# Patient Record
Sex: FEMALE
[De-identification: names, ages, dates, MRNs, and addresses within clinical notes are randomized per-mention and may not be internally consistent; named-entity substitution may affect disease eponyms.]

---

## 2024-04-18 ENCOUNTER — ASOB RESULT (OUTPATIENT)
Age: 29
End: 2024-04-18

## 2024-04-18 ENCOUNTER — APPOINTMENT (OUTPATIENT)
Dept: ANTEPARTUM | Facility: CLINIC | Age: 29
End: 2024-04-18
Payer: MEDICAID

## 2024-04-18 PROCEDURE — 76816 OB US FOLLOW-UP PER FETUS: CPT

## 2024-05-06 PROBLEM — Z00.00 ENCOUNTER FOR PREVENTIVE HEALTH EXAMINATION: Status: ACTIVE | Noted: 2024-05-06

## 2024-05-23 ENCOUNTER — APPOINTMENT (OUTPATIENT)
Dept: ANTEPARTUM | Facility: CLINIC | Age: 29
End: 2024-05-23

## 2024-06-06 ENCOUNTER — ASOB RESULT (OUTPATIENT)
Age: 29
End: 2024-06-06

## 2024-06-06 ENCOUNTER — APPOINTMENT (OUTPATIENT)
Dept: ANTEPARTUM | Facility: CLINIC | Age: 29
End: 2024-06-06

## 2024-06-06 PROCEDURE — 76805 OB US >/= 14 WKS SNGL FETUS: CPT

## 2024-06-06 PROCEDURE — 76817 TRANSVAGINAL US OBSTETRIC: CPT

## 2024-07-25 ENCOUNTER — APPOINTMENT (OUTPATIENT)
Dept: ANTEPARTUM | Facility: CLINIC | Age: 29
End: 2024-07-25
Payer: MEDICAID

## 2024-07-25 ENCOUNTER — ASOB RESULT (OUTPATIENT)
Age: 29
End: 2024-07-25

## 2024-07-25 PROCEDURE — 76817 TRANSVAGINAL US OBSTETRIC: CPT

## 2024-07-25 PROCEDURE — 76816 OB US FOLLOW-UP PER FETUS: CPT

## 2024-09-12 ENCOUNTER — ASOB RESULT (OUTPATIENT)
Age: 29
End: 2024-09-12

## 2024-09-12 ENCOUNTER — APPOINTMENT (OUTPATIENT)
Dept: ANTEPARTUM | Facility: CLINIC | Age: 29
End: 2024-09-12
Payer: MEDICAID

## 2024-09-12 PROCEDURE — 76819 FETAL BIOPHYS PROFIL W/O NST: CPT

## 2024-09-12 PROCEDURE — 76816 OB US FOLLOW-UP PER FETUS: CPT

## 2024-09-19 PROBLEM — Z82.49 FAMILY HISTORY OF HYPERTENSION: Status: ACTIVE | Noted: 2024-09-19

## 2024-09-19 PROBLEM — Z3A.37 37 WEEKS GESTATION OF PREGNANCY: Status: ACTIVE | Noted: 2024-09-19

## 2024-09-19 PROBLEM — O99.340 DEPRESSION AFFECTING PREGNANCY: Status: ACTIVE | Noted: 2024-09-19

## 2024-09-19 PROBLEM — O99.210 OBESITY AFFECTING PREGNANCY: Status: ACTIVE | Noted: 2024-09-19

## 2024-09-19 RX ORDER — PRENATAL VIT 49/IRON FUM/FOLIC 6.75-0.2MG
TABLET ORAL
Refills: 0 | Status: ACTIVE | COMMUNITY

## 2024-09-19 RX ORDER — SERTRALINE HYDROCHLORIDE 25 MG/1
TABLET, FILM COATED ORAL
Refills: 0 | Status: ACTIVE | COMMUNITY

## 2024-09-20 ENCOUNTER — APPOINTMENT (OUTPATIENT)
Dept: ANTEPARTUM | Facility: CLINIC | Age: 29
End: 2024-09-20

## 2024-09-20 ENCOUNTER — APPOINTMENT (OUTPATIENT)
Dept: MATERNAL FETAL MEDICINE | Facility: CLINIC | Age: 29
End: 2024-09-20

## 2024-09-20 DIAGNOSIS — O99.210 OBESITY COMPLICATING PREGNANCY, UNSPECIFIED TRIMESTER: ICD-10-CM

## 2024-09-20 DIAGNOSIS — F32.A OTHER MENTAL DISORDERS COMPLICATING PREGNANCY, UNSPECIFIED TRIMESTER: ICD-10-CM

## 2024-09-20 DIAGNOSIS — O99.340 OTHER MENTAL DISORDERS COMPLICATING PREGNANCY, UNSPECIFIED TRIMESTER: ICD-10-CM

## 2024-09-20 DIAGNOSIS — Z82.49 FAMILY HISTORY OF ISCHEMIC HEART DISEASE AND OTHER DISEASES OF THE CIRCULATORY SYSTEM: ICD-10-CM

## 2024-09-20 DIAGNOSIS — Z3A.37 37 WEEKS GESTATION OF PREGNANCY: ICD-10-CM

## 2024-10-03 ENCOUNTER — APPOINTMENT (OUTPATIENT)
Dept: ANTEPARTUM | Facility: CLINIC | Age: 29
End: 2024-10-03

## 2024-10-03 ENCOUNTER — APPOINTMENT (OUTPATIENT)
Dept: MATERNAL FETAL MEDICINE | Facility: CLINIC | Age: 29
End: 2024-10-03

## 2024-10-06 ENCOUNTER — INPATIENT (INPATIENT)
Facility: HOSPITAL | Age: 29
LOS: 1 days | Discharge: HOME | DRG: 833 | End: 2024-10-08
Attending: STUDENT IN AN ORGANIZED HEALTH CARE EDUCATION/TRAINING PROGRAM | Admitting: STUDENT IN AN ORGANIZED HEALTH CARE EDUCATION/TRAINING PROGRAM
Payer: MEDICAID

## 2024-10-06 VITALS — HEART RATE: 67 BPM | SYSTOLIC BLOOD PRESSURE: 119 MMHG | DIASTOLIC BLOOD PRESSURE: 74 MMHG | TEMPERATURE: 99 F

## 2024-10-06 DIAGNOSIS — O26.899 OTHER SPECIFIED PREGNANCY RELATED CONDITIONS, UNSPECIFIED TRIMESTER: ICD-10-CM

## 2024-10-06 DIAGNOSIS — O26.893 OTHER SPECIFIED PREGNANCY RELATED CONDITIONS, THIRD TRIMESTER: ICD-10-CM

## 2024-10-06 DIAGNOSIS — Z98.890 OTHER SPECIFIED POSTPROCEDURAL STATES: Chronic | ICD-10-CM

## 2024-10-06 LAB
ABO RH CONFIRMATION: SIGNIFICANT CHANGE UP
BASOPHILS # BLD AUTO: 0.02 K/UL — SIGNIFICANT CHANGE UP (ref 0–0.2)
BASOPHILS NFR BLD AUTO: 0.2 % — SIGNIFICANT CHANGE UP (ref 0–2)
BLD GP AB SCN SERPL QL: SIGNIFICANT CHANGE UP
EOSINOPHIL # BLD AUTO: 0.01 K/UL — SIGNIFICANT CHANGE UP (ref 0–0.5)
EOSINOPHIL NFR BLD AUTO: 0.1 % — SIGNIFICANT CHANGE UP (ref 0–6)
HCT VFR BLD CALC: 37.4 % — SIGNIFICANT CHANGE UP (ref 34.5–45)
HGB BLD-MCNC: 11.8 G/DL — SIGNIFICANT CHANGE UP (ref 11.5–15.5)
IMM GRANULOCYTES NFR BLD AUTO: 0.3 % — SIGNIFICANT CHANGE UP (ref 0–0.9)
LYMPHOCYTES # BLD AUTO: 1.69 K/UL — SIGNIFICANT CHANGE UP (ref 1–3.3)
LYMPHOCYTES # BLD AUTO: 19 % — SIGNIFICANT CHANGE UP (ref 13–44)
MCHC RBC-ENTMCNC: 26.7 PG — LOW (ref 27–34)
MCHC RBC-ENTMCNC: 31.6 GM/DL — LOW (ref 32–36)
MCV RBC AUTO: 84.6 FL — SIGNIFICANT CHANGE UP (ref 80–100)
MONOCYTES # BLD AUTO: 0.43 K/UL — SIGNIFICANT CHANGE UP (ref 0–0.9)
MONOCYTES NFR BLD AUTO: 4.8 % — SIGNIFICANT CHANGE UP (ref 2–14)
NEUTROPHILS # BLD AUTO: 6.7 K/UL — SIGNIFICANT CHANGE UP (ref 1.8–7.4)
NEUTROPHILS NFR BLD AUTO: 75.6 % — SIGNIFICANT CHANGE UP (ref 43–77)
PLATELET # BLD AUTO: 331 K/UL — SIGNIFICANT CHANGE UP (ref 150–400)
RBC # BLD: 4.42 M/UL — SIGNIFICANT CHANGE UP (ref 3.8–5.2)
RBC # FLD: 16.1 % — HIGH (ref 10.3–14.5)
WBC # BLD: 8.88 K/UL — SIGNIFICANT CHANGE UP (ref 3.8–10.5)
WBC # FLD AUTO: 8.88 K/UL — SIGNIFICANT CHANGE UP (ref 3.8–10.5)

## 2024-10-06 RX ORDER — SODIUM CITRATE AND CITRIC ACID MONOHYDRATE 334; 500 MG/5ML; MG/5ML
30 SOLUTION ORAL ONCE
Refills: 0 | Status: DISCONTINUED | OUTPATIENT
Start: 2024-10-06 | End: 2024-10-07

## 2024-10-06 RX ORDER — OXYTOCIN/RINGER'S LACTATE 20/500ML
PLASTIC BAG, INJECTION (ML) INTRAVENOUS
Qty: 30 | Refills: 0 | Status: DISCONTINUED | OUTPATIENT
Start: 2024-10-06 | End: 2024-10-07

## 2024-10-06 RX ORDER — OXYTOCIN/RINGER'S LACTATE 20/500ML
167 PLASTIC BAG, INJECTION (ML) INTRAVENOUS
Qty: 30 | Refills: 0 | Status: COMPLETED | OUTPATIENT
Start: 2024-10-06 | End: 2024-10-06

## 2024-10-06 RX ORDER — ETONOGESTREL 68 MG/1
68 IMPLANT SUBCUTANEOUS ONCE
Refills: 0 | Status: DISCONTINUED | OUTPATIENT
Start: 2024-10-06 | End: 2024-10-08

## 2024-10-06 RX ORDER — CHLORHEXIDINE GLUCONATE ORAL RINSE 1.2 MG/ML
1 SOLUTION DENTAL DAILY
Refills: 0 | Status: DISCONTINUED | OUTPATIENT
Start: 2024-10-06 | End: 2024-10-07

## 2024-10-06 RX ORDER — SODIUM CHLORIDE IRRIG SOLUTION 0.9 %
1000 SOLUTION, IRRIGATION IRRIGATION
Refills: 0 | Status: DISCONTINUED | OUTPATIENT
Start: 2024-10-06 | End: 2024-10-07

## 2024-10-06 RX ADMIN — Medication 125 MILLILITER(S): at 15:06

## 2024-10-06 RX ADMIN — Medication 2 MILLIUNIT(S)/MIN: at 15:41

## 2024-10-06 NOTE — OB PROVIDER H&P - HISTORY OF PRESENT ILLNESS
29y  at 39+6 weeks GA by LMP who presents to L&D for leakage of fluid and painful contractions. Patient states she has had contractions overnight, now occurring every 5 mins apart. States they are painful but not requiring medication. Also reports a large gush of fluid around 6 am, clear fluid. Patient denies vaginal bleeding. She endorses good fetal movement. Denies fevers, chills, nausea, vomiting, chest pain, SOB, dizziness and headache. No other complaints at this time.     LINDA: 10/7/2024  LMP: _    Prenatal course uncomplicated.      POB:  G1- 2010 , uncomplicated  G2- current  PGYN: -fibroids, -ovarian cysts, denies STD hx, denies abnormal PAPs   PMH: Denies  PSH: right knee surgery   SH: Denies EtOH, tobacco and illicit drug use during this pregnancy; feels safe at home   Meds: PNVs, pepcid  Allergies: NKDA    Sono:  EFW:     T(C): 37 (10-06-24 @ 13:41), Max: 37.0 (10-06-24 @ 13:14)  HR: 67 (10-06-24 @ 13:41) (67 - 67)  BP: 119/74 (10-06-24 @ 13:41) (119/74 - 119/74)  RR: 16 (10-06-24 @ 13:41) (16 - 16)  SpO2: --    Gen: NAD, well-appearing, AAOx3   Resp: breathing comfortably on RA  Abd: Soft, gravid, nontender  SSE:   SVE:    Bedside sono:  FHT: baseline _, moderate variability, +accels, -decels  [place]: q _ mins      A/P:   Patient is a 29y G_P_ at _ weeks GA admitted for_    -Admit to L&D  -Consent  -Admission labs  -IV fluids  -GDMA: FS q4h in latent labor, q2h in active labor; alternating fluids LR for D5 if FS <100  -Fetus: Cat I tracing. Continuous toco and fetal monitoring.   -GBS: Negative, no GBS ppx required   -Analgesia:     Discussed with  _     29y  at 39+6 weeks GA by LMP who presents to L&D for leakage of fluid and painful contractions. Patient states she has had contractions overnight, now occurring every 5 mins apart. States they are painful but not requiring medication. Also reports a large gush of fluid around 6 am, clear fluid. Patient denies vaginal bleeding. She endorses good fetal movement. Denies fevers, chills, nausea, vomiting, chest pain, SOB, dizziness and headache. No other complaints at this time.     LINDA: 10/7/2024  LMP: 2023    Prenatal course uncomplicated.      POB:  G1- 2010 , uncomplicated  G2- current  PGYN: -fibroids, -ovarian cysts, denies STD hx, denies abnormal PAPs   PMH: Denies  PSH: right knee surgery   SH: Denies EtOH, tobacco and illicit drug use during this pregnancy; feels safe at home   Meds: PNVs, pepcid  Allergies: NKDA    Sono: vertex, posterrior  EFW:  246 g (48%ile) on    29y  at 39+6 weeks GA by LMP who presents to L&D for leakage of fluid and painful contractions. Patient states she has had contractions overnight, now occurring every 5 mins apart. States they are painful but not requiring medication. Also reports a large gush of fluid around 6 am, clear fluid. Patient denies vaginal bleeding. She endorses good fetal movement. Denies fevers, chills, nausea, vomiting, chest pain, SOB, dizziness and headache. No other complaints at this time.   LINDA: 10/7/2024  LMP: 2023  Prenatal course uncomplicated.      POB:  G1- 2010 , uncomplicated  G2- current  PGYN: -fibroids, -ovarian cysts, denies STD hx, ASCUS, HPV- on last pap  PMH: Denies  PSH: right knee surgery   PPsych: Anxiety/Depression previously on Zoloft   SH: Denies EtOH, tobacco and illicit drug use during this pregnancy; feels safe at home   Meds: PNVs, pepcid  Allergies: NKDA    Sono: vertex, posterior  EFW:  246 g (48%ile) on

## 2024-10-06 NOTE — OB RN TRIAGE NOTE - GRAVIDA, OB PROFILE
2 Render Post-Care Instructions In Note?: no Number Of Freeze-Thaw Cycles: 1 freeze-thaw cycle Consent: The patient's consent was obtained including but not limited to risks of crusting, scabbing, blistering, scarring, darker or lighter pigmentary change, recurrence, incomplete removal and infection. Post-Care Instructions: I reviewed with the patient in detail post-care instructions. Patient is to wear sunprotection, and avoid picking at any of the treated lesions. Pt may apply Vaseline to crusted or scabbing areas. Show Applicator Variable?: Yes Duration Of Freeze Thaw-Cycle (Seconds): 0 Detail Level: Detailed

## 2024-10-06 NOTE — OB PROVIDER H&P - NSHPPHYSICALEXAM_GEN_ALL_CORE
T(C): 37 (10-06-24 @ 13:41), Max: 37.0 (10-06-24 @ 13:14)  HR: 67 (10-06-24 @ 13:41) (67 - 67)  BP: 119/74 (10-06-24 @ 13:41) (119/74 - 119/74)  RR: 16 (10-06-24 @ 13:41) (16 - 16)    Gen: NAD, well-appearing, AAOx3   Resp: breathing comfortably on RA  Abd: Soft, gravid, nontender  SSE: grossly ruptured, Nitrazine positive  SVE:  3/70/-3  Bedside sono: pending  FHT: baseline 140, moderate variability, -accels, -decels  [place]: q 7 mins T(C): 37 (10-06-24 @ 13:41), Max: 37.0 (10-06-24 @ 13:14)  HR: 67 (10-06-24 @ 13:41) (67 - 67)  BP: 119/74 (10-06-24 @ 13:41) (119/74 - 119/74)  RR: 16 (10-06-24 @ 13:41) (16 - 16)    Gen: NAD, well-appearing, AAOx3   Resp: breathing comfortably on RA  Abd: Soft, gravid, nontender  SSE: grossly ruptured, Nitrazine positive  SVE:  3/70/-3  Bedside sono: vertex, posterior placenta   FHT: baseline 140, moderate variability, -accels, -decels  [place]: q 7 mins

## 2024-10-06 NOTE — OB RN DELIVERY SUMMARY - NSSELHIDDEN_OBGYN_ALL_OB_FT
[NS_DeliveryAttending1_OBGYN_ALL_OB_FT:[id number]==],[NS_DeliveryAssist1_OBGYN_ALL_OB_FT:[id number]=] [NS_DeliveryAttending1_OBGYN_ALL_OB_FT:[id number]==],[NS_DeliveryAssist1_OBGYN_ALL_OB_FT:[email or web address]=],[NS_DeliveryRN_OBGYN_ALL_OB_FT:[id number]=]

## 2024-10-06 NOTE — OB RN PATIENT PROFILE - IF RESULT GREATER THAN 35 DAYS OLD SELECT STATUS
Refill request recieved via interface from pharmacy.     Last office visit 7/26/19; pending appt None    Script set to E-scribe pending MD approval. N/A

## 2024-10-06 NOTE — OB PROVIDER H&P - NSLOWPPHRISK_OBGYN_A_OB
No previous uterine incision/Ross Pregnancy/Less than or equal to 4 previous vaginal births/No known bleeding disorder/No history of postpartum hemorrhage

## 2024-10-06 NOTE — OB RN DELIVERY SUMMARY - NS_SEPSISRSKCALC_OBGYN_ALL_OB_FT
EOS calculated successfully. EOS Risk Factor: 0.5/1000 live births ([facility] national incidence); GA=40w;Temp=98.6; ROM=17.183; GBS='Negative'; Antibiotics='No antibiotics or any antibiotics < 2 hrs prior to birth'

## 2024-10-06 NOTE — OB PROVIDER H&P - ATTENDING COMMENTS
29y  at 39+6 weeks GA by LMP who is admitted for SROM in early labor.   Pregnancy course is uncomplicated. GBS negative.   SVE 3/70/-3, grossly ruptured, Nitrazine positive. Cat 1 tracing.   Hgb WNL.   Will continue with expectant management and start pitocin for augmentation PRN.   Desires Nexplanon postpartum.   Male infant, desire circumcision.   Patient counselled on R/B of labor and delivery management r including, but not limited to, augmentation agents, possible episiotomy, possible vaccuum delivery, possible CS, bleeding and infection. Patient is amenable to blood products in the event of an emergency. All questions answered to patient's satisfaction. Patient verbalized understanding and agreement with plan.

## 2024-10-06 NOTE — OB RN DELIVERY SUMMARY - NS_GENERALBABYACOMMENTA_OBGYN_ALL_OB_FT
mother coached and educated on breastfeeding and feeding cues from , despite education mother requested formula for

## 2024-10-06 NOTE — OB PROVIDER H&P - ASSESSMENT
A/P:   Patient is a 29y  at 39w6d GA admitted for SROM and labor    -Admit to L&D  -Consent  -Admission labs  -IV fluids  -Fetus: Cat I tracing. Continuous toco and fetal monitoring.   -GBS: Negative, no GBS ppx required   -Analgesia: epidural PRN  -Plan for pitocin to augment     Discussed with Dr. Mariano

## 2024-10-06 NOTE — OB RN TRIAGE NOTE - LANGUAGE ASSISTANCE NEEDED
CECILLE Cobian, RNC-translating with patient permssion/Yes-Patient/Caregiver accepts free interpretation services...

## 2024-10-07 ENCOUNTER — TRANSCRIPTION ENCOUNTER (OUTPATIENT)
Age: 29
End: 2024-10-07

## 2024-10-07 LAB
HCT VFR BLD CALC: 33.4 % — LOW (ref 34.5–45)
HGB BLD-MCNC: 10.7 G/DL — LOW (ref 11.5–15.5)
MEV IGG SER-ACNC: 40.3 AU/ML — SIGNIFICANT CHANGE UP
MEV IGG+IGM SER-IMP: POSITIVE — SIGNIFICANT CHANGE UP
T PALLIDUM AB TITR SER: NEGATIVE — SIGNIFICANT CHANGE UP

## 2024-10-07 RX ORDER — DIBUCAINE 1 %
1 OINTMENT (GRAM) TOPICAL EVERY 6 HOURS
Refills: 0 | Status: DISCONTINUED | OUTPATIENT
Start: 2024-10-07 | End: 2024-10-08

## 2024-10-07 RX ORDER — INFLUENZA VIRUS VACCINE 15; 15; 15; 15 UG/.5ML; UG/.5ML; UG/.5ML; UG/.5ML
0.5 SUSPENSION INTRAMUSCULAR ONCE
Refills: 0 | Status: COMPLETED | OUTPATIENT
Start: 2024-10-07 | End: 2024-10-07

## 2024-10-07 RX ORDER — PRAMOXINE HYDROCHLORIDE 10 MG/ML
1 LOTION TOPICAL EVERY 4 HOURS
Refills: 0 | Status: DISCONTINUED | OUTPATIENT
Start: 2024-10-07 | End: 2024-10-08

## 2024-10-07 RX ORDER — ANTI-ITCH CREAM 1 G/100G
1 OINTMENT TOPICAL EVERY 6 HOURS
Refills: 0 | Status: DISCONTINUED | OUTPATIENT
Start: 2024-10-07 | End: 2024-10-08

## 2024-10-07 RX ORDER — SOAP/LANOLIN
1 BAR TOPICAL EVERY 4 HOURS
Refills: 0 | Status: DISCONTINUED | OUTPATIENT
Start: 2024-10-07 | End: 2024-10-08

## 2024-10-07 RX ORDER — ACETAMINOPHEN 325 MG
975 TABLET ORAL
Refills: 0 | Status: DISCONTINUED | OUTPATIENT
Start: 2024-10-07 | End: 2024-10-08

## 2024-10-07 RX ORDER — TETANUS TOXOID, REDUCED DIPHTHERIA TOXOID AND ACELLULAR PERTUSSIS VACCINE, ADSORBED 5; 2.5; 8; 8; 2.5 [IU]/.5ML; [IU]/.5ML; UG/.5ML; UG/.5ML; UG/.5ML
0.5 SUSPENSION INTRAMUSCULAR ONCE
Refills: 0 | Status: DISCONTINUED | OUTPATIENT
Start: 2024-10-07 | End: 2024-10-08

## 2024-10-07 RX ORDER — MAGNESIUM HYDROXIDE 400 MG/5ML
30 SUSPENSION, ORAL (FINAL DOSE FORM) ORAL
Refills: 0 | Status: DISCONTINUED | OUTPATIENT
Start: 2024-10-07 | End: 2024-10-08

## 2024-10-07 RX ORDER — KETOROLAC TROMETHAMINE 10 MG/1
30 TABLET, FILM COATED ORAL ONCE
Refills: 0 | Status: DISCONTINUED | OUTPATIENT
Start: 2024-10-07 | End: 2024-10-07

## 2024-10-07 RX ORDER — METHYLERGONOVINE 0.2 MG/1
0.2 TABLET ORAL ONCE
Refills: 0 | Status: COMPLETED | OUTPATIENT
Start: 2024-10-07 | End: 2024-10-07

## 2024-10-07 RX ORDER — OXYTOCIN/RINGER'S LACTATE 20/500ML
167 PLASTIC BAG, INJECTION (ML) INTRAVENOUS
Qty: 30 | Refills: 0 | Status: DISCONTINUED | OUTPATIENT
Start: 2024-10-07 | End: 2024-10-08

## 2024-10-07 RX ORDER — PRENATAL VIT,CAL 76/IRON/FOLIC 29 MG-1 MG
1 TABLET ORAL DAILY
Refills: 0 | Status: DISCONTINUED | OUTPATIENT
Start: 2024-10-07 | End: 2024-10-08

## 2024-10-07 RX ORDER — SODIUM CHLORIDE 0.9 % (FLUSH) 0.9 %
3 SYRINGE (ML) INJECTION EVERY 8 HOURS
Refills: 0 | Status: DISCONTINUED | OUTPATIENT
Start: 2024-10-07 | End: 2024-10-08

## 2024-10-07 RX ORDER — DIPHENHYDRAMINE HCL 12.5MG/5ML
25 LIQUID (ML) ORAL EVERY 6 HOURS
Refills: 0 | Status: DISCONTINUED | OUTPATIENT
Start: 2024-10-07 | End: 2024-10-08

## 2024-10-07 RX ADMIN — Medication 600 MILLIGRAM(S): at 23:40

## 2024-10-07 RX ADMIN — Medication 3 MILLILITER(S): at 06:28

## 2024-10-07 RX ADMIN — METHYLERGONOVINE 0.2 MILLIGRAM(S): 0.2 TABLET ORAL at 02:11

## 2024-10-07 RX ADMIN — Medication 167 MILLIUNIT(S)/MIN: at 02:02

## 2024-10-07 RX ADMIN — KETOROLAC TROMETHAMINE 30 MILLIGRAM(S): 10 TABLET, FILM COATED ORAL at 02:30

## 2024-10-07 RX ADMIN — Medication 975 MILLIGRAM(S): at 20:36

## 2024-10-07 NOTE — PROCEDURE NOTE - ADDITIONAL PROCEDURE DETAILS
Procedure- Nexplanon Insertion    The risks, benefits, and alternatives of Nexplanon insertion were reviewed with the patient. All questions were answered to her satisfaction and consents were signed.    The patient was placed in the dorsal supine position with her non-dominant L arm flexed at the elbow and externally rotated. The area for insertion was marked approximately 8 cm from the medial epicondyle of the humerus over the triceps muscles. The area of planned insertion was prepped with Betadine with 3cc of 1% lidocaine was injected subdermally along the planned insertion tunnel. The Nexplanon applicator was grasped, the protection cap was removed from the applicator and the white Nexplanon device was visualized within the applicator. The applicator needle was inserted subdermally in the standard fashion, and the device was deployed. The implant was palpated to verify correct subdermal location by myself and the patient. The site dressed with a steristrip and a pressure bandage. User card was completed after insertion and given to patient.    A/P:   Nexplanon Insertion in L arm without complication  Removal Date 10/07/2027  100% condom use encouraged for sexually transmitted infection prevention  Wound care instructions reviewed, call if any problems  NSAIDs and Ice packs for insertion site pain    LOT # [id number]  Exp 2026-10   [id number]    Dr. Sparks was present at bedside.

## 2024-10-07 NOTE — DISCHARGE NOTE OB - CARE PROVIDERS DIRECT ADDRESSES
,DirectAddress_Unknown,[email or web address]

## 2024-10-07 NOTE — OB PROVIDER DELIVERY SUMMARY - NSSELHIDDEN_OBGYN_ALL_OB_FT
[NS_DeliveryAttending1_OBGYN_ALL_OB_FT:[id number]==],[NS_DeliveryAssist1_OBGYN_ALL_OB_FT:[email or web address]=],[NS_DeliveryRN_OBGYN_ALL_OB_FT:[id number]=]

## 2024-10-07 NOTE — DISCHARGE NOTE OB - MATERIALS PROVIDED
Breastfeeding Log/Breastfeeding Mother’s Support Group Information/Guide to Postpartum Care/[facility] Hearing Screen Program/Back To Sleep Handout/Shaken Baby Prevention Handout/Breastfeeding Guide and Packet/Tdap Vaccination (VIS Pub Date: January 24, 2012)

## 2024-10-07 NOTE — OB PROVIDER DELIVERY SUMMARY - NSPROVIDERDELIVERYNOTE_OBGYN_ALL_OB_FT
of a live M , 3300 gm, Apgars 8/9. Delivered OA, x1 nuchal cord, terminal meconium. Infant's head delivered with maternal expulsive efforts without difficulty. Shoulders then delivered without difficulty followed by the rest of the body. Nose and mouth were bulb suctioned. Cord clamped and cut after delay. Placenta delivered spontaneously, intact. Pitocin started. Fundus firm on bimanual massage with some DUSTY bleeding. IM methergine given. Hemostasis noted. Perineum, cervix and vagina were inspected and no lacerations were noted.   cc. Hemostasis noted. Patient stable and recovering in L&D.

## 2024-10-07 NOTE — DISCHARGE NOTE OB - CARE PROVIDER_API CALL
Elizabet Mariano  Obstetrics and Gynecology  Follow Up Time: 1 month    Arlene Flores  Obstetrics and Gynecology  [facility] [place] [address]  Phone: [phone]  Fax: [phone]  Follow Up Time: 1 month

## 2024-10-07 NOTE — DISCHARGE NOTE OB - PATIENT PORTAL LINK FT
You can access the FollowMyHealth Patient Portal offered by [facility] by registering at the following website: [email or web address]. By joining [employer]’s FollowMyHealth portal, you will also be able to view your health information using other applications (apps) compatible with our system.

## 2024-10-07 NOTE — DISCHARGE NOTE OB - MEDICATION SUMMARY - MEDICATIONS TO TAKE
I will START or STAY ON the medications listed below when I get home from the hospital:    ibuprofen 600 mg oral tablet  -- 1 tab(s) by mouth every 6 hours  -- Indication: For pain    Tylenol 325 mg oral tablet  -- 2 tab(s) by mouth every 6 hours  -- Indication: For pain    Prenatal Multivitamins with Folic Acid 1 mg oral tablet  -- 1 tab(s) by mouth once a day  -- Indication: For Home

## 2024-10-07 NOTE — DISCHARGE NOTE OB - HOSPITAL COURSE
Patient underwent a normal spontaneous vaginal delivery. Delivery was complicated by bleeding, for which patient received Im methergine as a uterotonic agent. Post-partum course was uncomplicated. Nexplanon implant was placed in the L arm for contraception. Pain is well controlled with PRN medication. She has no difficulty with ambulation, voiding, or PO intake. Lab values and vital signs are within normal limits prior to discharge.

## 2024-10-08 VITALS
TEMPERATURE: 98 F | DIASTOLIC BLOOD PRESSURE: 67 MMHG | SYSTOLIC BLOOD PRESSURE: 106 MMHG | RESPIRATION RATE: 18 BRPM | HEART RATE: 68 BPM | OXYGEN SATURATION: 96 %

## 2024-10-08 RX ORDER — ACETAMINOPHEN 325 MG
2 TABLET ORAL
Qty: 56 | Refills: 0
Start: 2024-10-08 | End: 2024-10-14

## 2024-10-08 RX ORDER — PRENATAL VIT,CAL 76/IRON/FOLIC 29 MG-1 MG
1 TABLET ORAL
Qty: 0 | Refills: 0 | DISCHARGE
Start: 2024-10-08

## 2024-10-08 RX ADMIN — Medication 600 MILLIGRAM(S): at 12:16

## 2024-10-08 RX ADMIN — Medication 975 MILLIGRAM(S): at 09:32

## 2024-10-08 RX ADMIN — Medication 1 TABLET(S): at 12:17

## 2024-10-08 NOTE — PROGRESS NOTE ADULT - SUBJECTIVE AND OBJECTIVE BOX
INTERVAL HPI/OVERNIGHT EVENTS:  29y Female s/p labor epidural on 10/6/24      Vital Signs Last 24 Hrs  T(C): 36.6 (07 Oct 2024 15:38), Max: 37.0 (07 Oct 2024 01:45)  T(F): 97.9 (07 Oct 2024 15:38), Max: 98.6 (07 Oct 2024 01:45)  HR: 80 (07 Oct 2024 15:38) (64 - 95)  BP: 100/65 (07 Oct 2024 15:38) (92/56 - 148/117)  BP(mean): --  RR: 18 (07 Oct 2024 15:38) (18 - 20)  SpO2: 95% (07 Oct 2024 15:38) (95% - 100%)    Parameters below as of 07 Oct 2024 15:38  Patient On (Oxygen Delivery Method): room air            Patient's overall anesthesia satisfaction: Positive    Patient doing well     No headache      No residual numbness or weakness, sensory and motor function intact    No anesthetic complications or complaints noted or reported                 
S: Patient doing well. Minimal lochia. No complaints.    O: Vital Signs Last 24 Hrs  T(C): 36.9 (07 Oct 2024 04:07), Max: 37.0 (06 Oct 2024 13:14)  T(F): 98.5 (07 Oct 2024 04:07), Max: 98.6 (06 Oct 2024 13:14)  HR: 75 (07 Oct 2024 04:07) (64 - 95)  BP: 119/76 (07 Oct 2024 04:07) (92/56 - 148/117)  BP(mean): --  RR: 18 (07 Oct 2024 04:07) (16 - 20)  SpO2: 96% (07 Oct 2024 04:07) (96% - 100%)    Parameters below as of 07 Oct 2024 04:07  Patient On (Oxygen Delivery Method): room air        Gen: NAD  Abd: soft, NT, ND, fundus firm below umbilicus  Ext: no tenderness    Labs:                        11.8   8.88  )-----------( 331      ( 06 Oct 2024 14:10 )             37.4     Antibody Screen: NEG (10-06 @ 14:10)       A/P PP # 0  Doing well.  Routine post partum care.  Discharge home in AM.      
LUZ HERRERA is a 29y  now PPD#1 s/p spontaneous vaginal delivery at 39w6d gestation, s/p IM methergine for uterine atony.     S:    No acute events overnight.   The patient has no complaints.  Pain controlled with current treatment regimen.   She is ambulating without difficulty and tolerating PO.   + flatus/-BM/+ voiding   She endorses appropriate lochia, which is decreasing.   She denies fevers, chills, nausea and vomiting.   She denies lightheadedness, dizziness, palpitations, chest pain and SOB.     O:    T(C): 36.9 (10-08-24 @ 04:00), Max: 36.9 (10-08-24 @ 04:00)  HR: 68 (10-08-24 @ :00) (68 - 80)  BP: 106/67 (10-08-24 @ 04:00) (100/65 - 106/67)  RR: 18 (10-08-24 @ 04:00) (18 - 18)  SpO2: 96% (10-08-24 @ 04:00) (95% - 96%)    Gen: NAD, AOx3  Pulm: Breathing comfortably on room air  Abdomen:  Soft, non-tender, non-distended   Uterus:  Fundus firm below umbilicus  VE:  Expectant lochia  Ext:  Non-tender and non-edematous                          10.7   x     )-----------( x        ( 07 Oct 2024 06:48 )             33.4

## 2024-10-08 NOTE — PROGRESS NOTE ADULT - ASSESSMENT
A/P:    LUZ HERRERA is a 29y  now PPD#1 s/p spontaneous vaginal delivery at 39w6d gestation, s/p IM methergine for uterine atony.     -Vital signs stable  -Hgb: 11.1 -> 10.7, asymptomatic  -Voiding, tolerating PO, bowel function nml   -Advance care as tolerated   -Continue routine postpartum care and education  -Healthy male infant, desires circumcision  -Nexplanon palced postpartum   -Dispo: Pt is stable, doing well and meeting all postpartum milestones. Possible discharge to home today pending attending approval.

## 2024-10-08 NOTE — PROGRESS NOTE ADULT - ATTENDING COMMENTS
PPD#2   s/p [employer] atony requering one dose of methergine  s/p Nexplanon  Doing well  meeting milestones  Hgb 11.8 --> 10.7- no signs or sx's of anemia  f/u in the office in 2 weeks  precautions provided  discharge home  ppalos

## 2024-10-11 ENCOUNTER — NON-APPOINTMENT (OUTPATIENT)
Age: 29
End: 2024-10-11

## 2025-01-29 NOTE — DISCHARGE NOTE OB - MOOD SWINGS / DEPRESSION OR CRYING SPELLS LASTING MORE THAN 3 DAYS
Called pt to see if he was still coming in for his procedure. He states he is planning to be here and will be in the next 30 minutes. SID notified.   
Statement Selected